# Patient Record
Sex: MALE | Race: WHITE | Employment: FULL TIME | URBAN - METROPOLITAN AREA
[De-identification: names, ages, dates, MRNs, and addresses within clinical notes are randomized per-mention and may not be internally consistent; named-entity substitution may affect disease eponyms.]

---

## 2019-03-09 ENCOUNTER — APPOINTMENT (OUTPATIENT)
Dept: CT IMAGING | Age: 24
End: 2019-03-09
Attending: EMERGENCY MEDICINE
Payer: SELF-PAY

## 2019-03-09 ENCOUNTER — HOSPITAL ENCOUNTER (EMERGENCY)
Age: 24
Discharge: HOME | End: 2019-03-09
Attending: EMERGENCY MEDICINE
Payer: SELF-PAY

## 2019-03-09 VITALS
BODY MASS INDEX: 25.93 KG/M2 | HEIGHT: 74 IN | SYSTOLIC BLOOD PRESSURE: 135 MMHG | HEART RATE: 72 BPM | RESPIRATION RATE: 16 BRPM | TEMPERATURE: 98.3 F | WEIGHT: 202 LBS | OXYGEN SATURATION: 100 % | DIASTOLIC BLOOD PRESSURE: 76 MMHG

## 2019-03-09 DIAGNOSIS — K50.911 CROHN'S DISEASE WITH RECTAL BLEEDING, UNSPECIFIED GASTROINTESTINAL TRACT LOCATION (HCC): Primary | ICD-10-CM

## 2019-03-09 LAB
ALBUMIN SERPL-MCNC: 3.4 G/DL (ref 3.5–5)
ALBUMIN/GLOB SERPL: 0.9 {RATIO}
ALP SERPL-CCNC: 108 U/L (ref 50–136)
ALT SERPL-CCNC: 50 U/L (ref 12–65)
ANION GAP SERPL CALC-SCNC: 6 MMOL/L
AST SERPL-CCNC: 27 U/L (ref 15–37)
BASOPHILS # BLD: 0 K/UL (ref 0–0.2)
BASOPHILS NFR BLD: 0 % (ref 0–2)
BILIRUB SERPL-MCNC: 0.9 MG/DL (ref 0.2–1.1)
BUN SERPL-MCNC: 9 MG/DL (ref 6–23)
CALCIUM SERPL-MCNC: 8.6 MG/DL (ref 8.3–10.4)
CHLORIDE SERPL-SCNC: 107 MMOL/L (ref 98–107)
CO2 SERPL-SCNC: 27 MMOL/L (ref 21–32)
CREAT SERPL-MCNC: 0.74 MG/DL (ref 0.8–1.5)
DIFFERENTIAL METHOD BLD: ABNORMAL
EOSINOPHIL # BLD: 0.2 K/UL (ref 0–0.8)
EOSINOPHIL NFR BLD: 2 % (ref 0.5–7.8)
ERYTHROCYTE [DISTWIDTH] IN BLOOD BY AUTOMATED COUNT: 13.9 % (ref 11.9–14.6)
GLOBULIN SER CALC-MCNC: 3.8 G/DL (ref 2.3–3.5)
GLUCOSE SERPL-MCNC: 90 MG/DL (ref 65–100)
HCT VFR BLD AUTO: 44.2 % (ref 41.1–50.3)
HGB BLD-MCNC: 14.5 G/DL (ref 13.6–17.2)
IMM GRANULOCYTES # BLD AUTO: 0 K/UL (ref 0–0.5)
IMM GRANULOCYTES NFR BLD AUTO: 0 % (ref 0–5)
LYMPHOCYTES # BLD: 2 K/UL (ref 0.5–4.6)
LYMPHOCYTES NFR BLD: 28 % (ref 13–44)
MCH RBC QN AUTO: 29.4 PG (ref 26.1–32.9)
MCHC RBC AUTO-ENTMCNC: 32.8 G/DL (ref 31.4–35)
MCV RBC AUTO: 89.7 FL (ref 79.6–97.8)
MONOCYTES # BLD: 0.7 K/UL (ref 0.1–1.3)
MONOCYTES NFR BLD: 10 % (ref 4–12)
NEUTS SEG # BLD: 4.3 K/UL (ref 1.7–8.2)
NEUTS SEG NFR BLD: 59 % (ref 43–78)
NRBC # BLD: 0 K/UL (ref 0–0.2)
PLATELET # BLD AUTO: 409 K/UL (ref 150–450)
PMV BLD AUTO: 8.8 FL (ref 9.4–12.3)
POTASSIUM SERPL-SCNC: 3.8 MMOL/L (ref 3.5–5.1)
PROT SERPL-MCNC: 7.2 G/DL
RBC # BLD AUTO: 4.93 M/UL (ref 4.23–5.6)
SODIUM SERPL-SCNC: 140 MMOL/L (ref 136–145)
WBC # BLD AUTO: 7.3 K/UL (ref 4.3–11.1)

## 2019-03-09 PROCEDURE — 81003 URINALYSIS AUTO W/O SCOPE: CPT | Performed by: EMERGENCY MEDICINE

## 2019-03-09 PROCEDURE — 74011250636 HC RX REV CODE- 250/636: Performed by: EMERGENCY MEDICINE

## 2019-03-09 PROCEDURE — 74177 CT ABD & PELVIS W/CONTRAST: CPT

## 2019-03-09 PROCEDURE — 80053 COMPREHEN METABOLIC PANEL: CPT

## 2019-03-09 PROCEDURE — 96374 THER/PROPH/DIAG INJ IV PUSH: CPT | Performed by: EMERGENCY MEDICINE

## 2019-03-09 PROCEDURE — 74011636320 HC RX REV CODE- 636/320: Performed by: EMERGENCY MEDICINE

## 2019-03-09 PROCEDURE — 74011000250 HC RX REV CODE- 250: Performed by: EMERGENCY MEDICINE

## 2019-03-09 PROCEDURE — 85025 COMPLETE CBC W/AUTO DIFF WBC: CPT

## 2019-03-09 PROCEDURE — 99284 EMERGENCY DEPT VISIT MOD MDM: CPT | Performed by: EMERGENCY MEDICINE

## 2019-03-09 PROCEDURE — 74011000258 HC RX REV CODE- 258: Performed by: EMERGENCY MEDICINE

## 2019-03-09 PROCEDURE — C9113 INJ PANTOPRAZOLE SODIUM, VIA: HCPCS | Performed by: EMERGENCY MEDICINE

## 2019-03-09 RX ORDER — CIPROFLOXACIN 500 MG/1
500 TABLET ORAL 2 TIMES DAILY
Qty: 28 TAB | Refills: 0 | Status: SHIPPED | OUTPATIENT
Start: 2019-03-09 | End: 2019-03-23

## 2019-03-09 RX ORDER — METRONIDAZOLE 500 MG/1
500 TABLET ORAL 2 TIMES DAILY
Qty: 28 TAB | Refills: 0 | Status: SHIPPED | OUTPATIENT
Start: 2019-03-09 | End: 2019-03-23

## 2019-03-09 RX ORDER — PREDNISONE 20 MG/1
60 TABLET ORAL DAILY
Qty: 12 TAB | Refills: 0 | Status: SHIPPED | OUTPATIENT
Start: 2019-03-09 | End: 2019-03-13

## 2019-03-09 RX ORDER — SODIUM CHLORIDE 0.9 % (FLUSH) 0.9 %
10 SYRINGE (ML) INJECTION
Status: COMPLETED | OUTPATIENT
Start: 2019-03-09 | End: 2019-03-09

## 2019-03-09 RX ADMIN — SODIUM CHLORIDE 40 MG: 9 INJECTION, SOLUTION INTRAMUSCULAR; INTRAVENOUS; SUBCUTANEOUS at 13:28

## 2019-03-09 RX ADMIN — IOPAMIDOL 100 ML: 755 INJECTION, SOLUTION INTRAVENOUS at 14:37

## 2019-03-09 RX ADMIN — DIATRIZOATE MEGLUMINE AND DIATRIZOATE SODIUM 15 ML: 660; 100 LIQUID ORAL; RECTAL at 13:28

## 2019-03-09 RX ADMIN — SODIUM CHLORIDE 100 ML: 900 INJECTION, SOLUTION INTRAVENOUS at 14:37

## 2019-03-09 RX ADMIN — Medication 10 ML: at 14:37

## 2019-03-09 NOTE — ED NOTES
I have reviewed discharge instructions with the patient. The patient verbalized understanding. Patient left ED via Discharge Method: ambulatory to Home with male visitor. Opportunity for questions and clarification provided. Patient given 3 scripts. Prednisone, cipro, flagyl. Instructed to follow up with GI MD provided. Pt declined need for work excuse. To continue your aftercare when you leave the hospital, you may receive an automated call from our care team to check in on how you are doing. This is a free service and part of our promise to provide the best care and service to meet your aftercare needs.  If you have questions, or wish to unsubscribe from this service please call [phone]. Thank you for Choosing our [facility] Emergency Department.

## 2019-03-09 NOTE — DISCHARGE INSTRUCTIONS
Patient Education        Crohn's Disease: Care Instructions  Your Care Instructions    Crohn's disease is a lifelong inflammatory bowel disease (IBD). Parts of the digestive tract get swollen and irritated and may develop deep sores called ulcers. Crohn's disease usually occurs in the last part of the small intestine and the first part of the large intestine. But it can develop anywhere from the mouth to the anus. The main symptoms of Crohn's disease are belly pain, diarrhea, fever, and weight loss. Some people may have constipation. Crohn's disease also sometimes causes problems with the joints, eyes, or skin. Your symptoms may be mild at some times and severe at others. The disease can also go into remission, which means that it is not active and you have no symptoms. Bad attacks of Crohn's disease often have to be treated in the hospital so that you can get medicines and liquids through a tube in your vein, called an IV. This gives your digestive system time to rest and recover. Talk with your doctor about the best treatments for you. You may need medicines that help prevent or treat flare-ups of the disease. You may need surgery to remove part of your bowel if you have an abnormal opening in the bowel (fistula), an abscess, or a bowel obstruction. In some cases, surgery is needed if medicines do not work. But symptoms often return to other areas of the intestines after surgery. Learning good self-care can help you reduce your symptoms and manage Crohn's disease. Follow-up care is a key part of your treatment and safety. Be sure to make and go to all appointments, and call your doctor if you are having problems. It's also a good idea to know your test results and keep a list of the medicines you take. How can you care for yourself at home? · Take your medicines exactly as prescribed. Call your doctor if you think you are having a problem with your medicine.  You will get more details on the specific medicines your doctor prescribes. · Do not take anti-inflammatory medicines, such as aspirin, ibuprofen (Advil, Motrin), or naproxen (Aleve). They may make your symptoms worse. Do not take any other medicines or herbal products without talking to your doctor first.  · Avoid foods that make your symptoms worse. These might include milk, alcohol, high-fiber foods, or spicy foods. · Eat a healthy diet. Make sure to get enough iron. Rectal bleeding may make you lose iron. Good sources of iron include beef, lentils, spinach, raisins, and iron-enriched breads and cereals. · Drink liquid meal replacements if your doctor recommends them. These are high in calories and contain vitamins and minerals. Severe symptoms may make it hard for your body to absorb vitamins and minerals. · Do not smoke. Smoking makes Crohn's disease worse. If you need help quitting, talk to your doctor about stop-smoking programs and medicines. These can increase your chances of quitting for good. · Seek support from friends and family to help cope with Crohn's disease. The illness can affect all parts of your life. Get counseling if you need it. When should you call for help? Call 911 anytime you think you may need emergency care. For example, call if:    · Your stools are maroon or very bloody.     · You passed out (lost consciousness).    Call your doctor now or seek immediate medical care if:    · You are vomiting.     · You have new or worse belly pain.     · You have a fever.     · You cannot pass stools or gas.     · You have new or more blood in your stools.    Watch closely for changes in your health, and be sure to contact your doctor if:    · You have new or worse symptoms.     · You are losing weight.     · You do not get better as expected. Where can you learn more? Go to http://andry-jonnathan.info/. Enter 21 [phone] in the search box to learn more about \"Crohn's Disease: Care Instructions. \"  Current as of: March 27, 2018  Content Version: 11.9  © 2401-9763 [employer]. Care instructions adapted under license by [employer] (which disclaims liability or warranty for this information). If you have questions about a medical condition or this instruction, always ask your healthcare professional. [address] any warranty or liability for your use of this information.

## 2019-12-13 ENCOUNTER — HOSPITAL ENCOUNTER (EMERGENCY)
Dept: HOSPITAL 5 - ED | Age: 24
Discharge: HOME | End: 2019-12-13
Payer: COMMERCIAL

## 2019-12-13 VITALS — DIASTOLIC BLOOD PRESSURE: 80 MMHG | SYSTOLIC BLOOD PRESSURE: 127 MMHG

## 2019-12-13 DIAGNOSIS — F12.10: ICD-10-CM

## 2019-12-13 DIAGNOSIS — K50.90: Primary | ICD-10-CM

## 2019-12-13 LAB
BASOPHILS # (AUTO): 0.1 K/MM3 (ref 0–0.1)
BASOPHILS NFR BLD AUTO: 0.6 % (ref 0–1.8)
BUN SERPL-MCNC: 16 MG/DL (ref 9–20)
BUN/CREAT SERPL: 18 %
CALCIUM SERPL-MCNC: 9.8 MG/DL (ref 8.4–10.2)
EOSINOPHIL # BLD AUTO: 0.1 K/MM3 (ref 0–0.4)
EOSINOPHIL NFR BLD AUTO: 1 % (ref 0–4.3)
HCT VFR BLD CALC: 49.8 % (ref 35.5–45.6)
HEMOLYSIS INDEX: 12
HGB BLD-MCNC: 17.1 GM/DL (ref 11.8–15.2)
LYMPHOCYTES # BLD AUTO: 1.9 K/MM3 (ref 1.2–5.4)
LYMPHOCYTES NFR BLD AUTO: 20.4 % (ref 13.4–35)
MCHC RBC AUTO-ENTMCNC: 34 % (ref 32–34)
MCV RBC AUTO: 90 FL (ref 84–94)
MONOCYTES # (AUTO): 1.1 K/MM3 (ref 0–0.8)
MONOCYTES % (AUTO): 11.9 % (ref 0–7.3)
PLATELET # BLD: 329 K/MM3 (ref 140–440)
RBC # BLD AUTO: 5.51 M/MM3 (ref 3.65–5.03)

## 2019-12-13 PROCEDURE — 99283 EMERGENCY DEPT VISIT LOW MDM: CPT

## 2019-12-13 PROCEDURE — 96374 THER/PROPH/DIAG INJ IV PUSH: CPT

## 2019-12-13 PROCEDURE — 36415 COLL VENOUS BLD VENIPUNCTURE: CPT

## 2019-12-13 PROCEDURE — 80048 BASIC METABOLIC PNL TOTAL CA: CPT

## 2019-12-13 PROCEDURE — 96375 TX/PRO/DX INJ NEW DRUG ADDON: CPT

## 2019-12-13 PROCEDURE — 85025 COMPLETE CBC W/AUTO DIFF WBC: CPT

## 2019-12-13 NOTE — EMERGENCY DEPARTMENT REPORT
ED Abdominal Pain HPI





- General


Chief Complaint: GI Bleed


Stated Complaint: BLOODY STOOL/SOB


Time Seen by Provider: 12/13/19 07:46


Source: patient


Mode of arrival: Ambulatory


Limitations: No Limitations





- History of Present Illness


Initial Comments: 





This is a 24-year-old male with a past medical history of Crohn's disease for 

the past 4 years and presents to ED complaining of Crohn's flareup causing some 

generalized abdominal pain with some dark red blood in stool.  Patient denies 

fevers/nausea vomiting or diarrhea.  Patient states that he has a mild throbbing

headache but denies any injuries.  He also stated that he does not have a GI 

doctor but is waiting to have surgery sometime soon.


MD Complaint: abdominal pain


Location: diffuse


Migration to: no migration


Severity scale (0 -10): 5


Quality: aching





- Related Data


                                  Previous Rx's











 Medication  Instructions  Recorded  Last Taken  Type


 


Dicyclomine [Bentyl] 20 mg PO TID #30 tablet 12/13/19 Unknown Rx


 


predniSONE [Deltasone] 40 mg PO QDAY #15 tab 12/13/19 Unknown Rx











                                    Allergies











Allergy/AdvReac Type Severity Reaction Status Date / Time


 


No Known Allergies Allergy   Unverified 12/13/19 07:34














ED Review of Systems


ROS: 


Stated complaint: BLOODY STOOL/SOB


Other details as noted in HPI





Comment: All other systems reviewed and negative





ED Past Medical Hx





- Past Medical History


Previous Medical History?: Yes


Additional medical history: Crohn's





- Surgical History


Past Surgical History?: No





- Social History


Smoking Status: Never Smoker


Substance Use Type: Alcohol, Marijuana





- Medications


Home Medications: 


                                Home Medications











 Medication  Instructions  Recorded  Confirmed  Last Taken  Type


 


Dicyclomine [Bentyl] 20 mg PO TID #30 tablet 12/13/19  Unknown Rx


 


predniSONE [Deltasone] 40 mg PO QDAY #15 tab 12/13/19  Unknown Rx














ED Physical Exam





- General


Limitations: No Limitations


General appearance: alert, in no apparent distress





- Head


Head exam: Present: atraumatic, normocephalic





- Eye


Eye exam: Present: normal appearance





- ENT


ENT exam: Present: mucous membranes moist





- Neck


Neck exam: Present: normal inspection





- Respiratory


Respiratory exam: Present: normal lung sounds bilaterally.  Absent: respiratory 

distress





- Cardiovascular


Cardiovascular Exam: Present: regular rate, normal rhythm.  Absent: systolic 

murmur, diastolic murmur, rubs, gallop





- GI/Abdominal


GI/Abdominal exam: Present: soft, tenderness (mildly tender at lower abdominal 

quadrant), normal bowel sounds.  Absent: distended, guarding, rebound, mass, 

pulsatile mass





- Rectal


Rectal exam: Present: deferred





- Extremities Exam


Extremities exam: Present: normal inspection





- Back Exam


Back exam: Present: normal inspection





- Neurological Exam


Neurological exam: Present: alert, oriented X3





- Psychiatric


Psychiatric exam: Present: normal affect, normal mood





- Skin


Skin exam: Present: warm, dry, intact, normal color.  Absent: rash





ED Course





                                   Vital Signs











  12/13/19





  07:30


 


Temperature 98.3 F


 


Pulse Rate 116 H


 


Respiratory 20





Rate 


 


Blood Pressure 127/80


 


O2 Sat by Pulse 100





Oximetry 














ED Medical Decision Making





- Lab Data


Result diagrams: 


                                 12/13/19 07:57





                                 12/13/19 07:57








                             Laboratory Last Values











WBC  9.2 K/mm3 (4.5-11.0)   12/13/19  07:57    


 


RBC  5.51 M/mm3 (3.65-5.03)  H  12/13/19  07:57    


 


Hgb  17.1 gm/dl (11.8-15.2)  H  12/13/19  07:57    


 


Hct  49.8 % (35.5-45.6)  H  12/13/19  07:57    


 


MCV  90 fl (84-94)   12/13/19  07:57    


 


MCH  31 pg (28-32)   12/13/19  07:57    


 


MCHC  34 % (32-34)   12/13/19  07:57    


 


RDW  13.7 % (13.2-15.2)   12/13/19  07:57    


 


Plt Count  329 K/mm3 (140-440)   12/13/19  07:57    


 


Lymph % (Auto)  20.4 % (13.4-35.0)   12/13/19  07:57    


 


Mono % (Auto)  11.9 % (0.0-7.3)  H  12/13/19  07:57    


 


Eos % (Auto)  1.0 % (0.0-4.3)   12/13/19  07:57    


 


Baso % (Auto)  0.6 % (0.0-1.8)   12/13/19  07:57    


 


Lymph #  1.9 K/mm3 (1.2-5.4)   12/13/19  07:57    


 


Mono #  1.1 K/mm3 (0.0-0.8)  H  12/13/19  07:57    


 


Eos #  0.1 K/mm3 (0.0-0.4)   12/13/19  07:57    


 


Baso #  0.1 K/mm3 (0.0-0.1)   12/13/19  07:57    


 


Seg Neutrophils %  66.1 % (40.0-70.0)   12/13/19  07:57    


 


Seg Neutrophils #  6.1 K/mm3 (1.8-7.7)   12/13/19  07:57    


 


Sodium  136 mmol/L (137-145)  L  12/13/19  07:57    


 


Potassium  3.5 mmol/L (3.6-5.0)  L  12/13/19  07:57    


 


Chloride  98.7 mmol/L ()   12/13/19  07:57    


 


Carbon Dioxide  20 mmol/L (22-30)  L  12/13/19  07:57    


 


Anion Gap  21 mmol/L  12/13/19  07:57    


 


BUN  16 mg/dL (9-20)   12/13/19  07:57    


 


Creatinine  0.9 mg/dL (0.8-1.5)   12/13/19  07:57    


 


Estimated GFR  > 60 ml/min  12/13/19  07:57    


 


BUN/Creatinine Ratio  18 %  12/13/19  07:57    


 


Glucose  98 mg/dL ()   12/13/19  07:57    


 


Calcium  9.8 mg/dL (8.4-10.2)   12/13/19  07:57    














- Medical Decision Making





This 24-year-old male who presents with Crohn flareup


Patient received IV fluids, glucose steroid Pepcid and Zofran in the ED.


CBC and BMP were within normal limits.  BMP shows mildly low sodium and chloride

 which patient is pain received by fluid resuscitation.


Patient is not in a lot of acute pain noisy..  Patient was able to laugh Fatjo's

 and laugh with his partner in the room. 


Discussed with the patient importance of following up with a GI doctor.  GI 

doctor was referred given to patient.


Vital signs are okay patient is in no acute distress


Critical care attestation.: 


If time is entered above; I have spent that time in minutes in the direct care 

of this critically ill patient, excluding procedure time.








ED Disposition


Clinical Impression: 


 Acute Crohn's disease without complication





Disposition: DC-01 TO HOME OR SELFCARE


Is pt being admited?: No


Does the pt Need Aspirin: No


Condition: Stable


Instructions:  Crohn Disease (ED)


Additional Instructions: 


Make sure to follow up with the primary care physician as discussed.


Having given gastroenterologist referral please follow up


Take all your medications as you've been prescribed.


If you have any worsening symptoms or develop new symptoms please return to ED 

immediately.


Prescriptions: 


Dicyclomine [Bentyl] 20 mg PO TID #30 tablet


predniSONE [Deltasone] 40 mg PO QDAY #15 tab


Referrals: 


[facility] [Provider Group] - 3-5 Days


[facility] [Provider Group] - 3-5 Days


[facility] [Outside] - 3-5 Days


[facility] [Outside] - 3-5 Days


Forms:  Accompanied Note, Work/School Release Form(ED)


Time of Disposition: 08:49

## 2021-07-22 NOTE — ED PROVIDER NOTES
Patient with a questionable history of Crohn's disease. Not on any medications. Also with previous appendectomy. States 1 month history of off and on, blood in the stool. 2 days ago blood, increased. No diarrhea or constipation. No nausea or vomiting. Mild lower abdominal pain. The history is provided by the patient. No  was used. Rectal Bleeding    This is a new problem. The current episode started 2 days ago (one month). The stool is described as bloody coated. Associated symptoms include abdominal pain (mild RLQ). Pertinent negatives include no dysuria, no chills, no fever, no nausea, no back pain, no vomiting, no diarrhea and no constipation. He has tried nothing for the symptoms. Past Medical History:   Diagnosis Date    [facility]        History reviewed. No pertinent surgical history. History reviewed. No pertinent family history. Social History     Socioeconomic History    Marital status: SINGLE     Spouse name: Not on file    Number of children: Not on file    Years of education: Not on file    Highest education level: Not on file   Social Needs    Financial resource strain: Not on file    Food insecurity - worry: Not on file    Food insecurity - inability: Not on file    Transportation needs - medical: Not on file   [employer] needs - non-medical: Not on file   Occupational History    Not on file   Tobacco Use    Smoking status: Current Some Day Smoker     Packs/day: 0.50    Smokeless tobacco: Never Used   Substance and Sexual Activity    Alcohol use: Yes    Drug use: Yes     Types: Cocaine     Comment: reports last time x 4 months     Sexual activity: Not on file   Other Topics Concern    Not on file   Social History Narrative    Not on file         ALLERGIES: Patient has no known allergies. Review of Systems   Constitutional: Negative for chills and fever. HENT: Negative for rhinorrhea and sore throat.     Eyes: Negative for pain and redness. Respiratory: Negative for chest tightness, shortness of breath and wheezing. Cardiovascular: Negative for chest pain and leg swelling. Gastrointestinal: Positive for abdominal pain (mild RLQ) and anal bleeding. Negative for constipation, diarrhea, nausea, rectal pain and vomiting. Genitourinary: Negative for dysuria and hematuria. Musculoskeletal: Negative for back pain, gait problem, neck pain and neck stiffness. Skin: Negative for color change and rash. Neurological: Negative for weakness, numbness and headaches. Vitals:    03/09/19 1255   BP: 133/85   Pulse: 80   Resp: 18   Temp: 98.3 °F (36.8 °C)   SpO2: 99%   Weight: 91.6 kg (202 lb)   Height: 6' 2\" (1.88 m)            Physical Exam   Constitutional: He is oriented to person, place, and time. He appears well-developed and well-nourished. HENT:   Head: Normocephalic and atraumatic. Neck: Normal range of motion. Neck supple. Cardiovascular: Normal rate and regular rhythm. Pulmonary/Chest: Effort normal and breath sounds normal.   Abdominal: Soft. Bowel sounds are normal. There is no tenderness. There is no rebound and no guarding. Genitourinary:   Genitourinary Comments: No hemorrhoids. Mild pain on rectal exam. No mass. Musculoskeletal: Normal range of motion. He exhibits no edema. Neurological: He is alert and oriented to person, place, and time. Skin: Skin is warm and dry. MDM  Number of Diagnoses or Management Options  Diagnosis management comments: Patient with slight wall thickening in the rectum with slight blood present on exam.  We'll treat his Crohn's outpatient and referred to GI.        Amount and/or Complexity of Data Reviewed  Clinical lab tests: ordered and reviewed  Tests in the radiology section of CPT®: ordered and reviewed  Tests in the medicine section of CPT®: ordered and reviewed    Patient Progress  Patient progress: stable         Procedures      Results Include:    Recent Results (from the past 24 hour(s))   CBC WITH AUTOMATED DIFF    Collection Time: 03/09/19  1:32 PM   Result Value Ref Range    WBC 7.3 4.3 - 11.1 K/uL    RBC 4.93 4.23 - 5.6 M/uL    HGB 14.5 13.6 - 17.2 g/dL    HCT 44.2 41.1 - 50.3 %    MCV 89.7 79.6 - 97.8 FL    MCH 29.4 26.1 - 32.9 PG    MCHC 32.8 31.4 - 35.0 g/dL    RDW 13.9 11.9 - 14.6 %    PLATELET 384 374 - 104 K/uL    MPV 8.8 (L) 9.4 - 12.3 FL    ABSOLUTE NRBC 0.00 0.0 - 0.2 K/uL    DF AUTOMATED      NEUTROPHILS 59 43 - 78 %    LYMPHOCYTES 28 13 - 44 %    MONOCYTES 10 4.0 - 12.0 %    EOSINOPHILS 2 0.5 - 7.8 %    BASOPHILS 0 0.0 - 2.0 %    IMMATURE GRANULOCYTES 0 0.0 - 5.0 %    ABS. NEUTROPHILS 4.3 1.7 - 8.2 K/UL    ABS. LYMPHOCYTES 2.0 0.5 - 4.6 K/UL    ABS. MONOCYTES 0.7 0.1 - 1.3 K/UL    ABS. EOSINOPHILS 0.2 0.0 - 0.8 K/UL    ABS. BASOPHILS 0.0 0.0 - 0.2 K/UL    ABS. IMM. GRANS. 0.0 0.0 - 0.5 K/UL   METABOLIC PANEL, COMPREHENSIVE    Collection Time: 03/09/19  1:32 PM   Result Value Ref Range    Sodium 140 136 - 145 mmol/L    Potassium 3.8 3.5 - 5.1 mmol/L    Chloride 107 98 - 107 mmol/L    CO2 27 21 - 32 mmol/L    Anion gap 6 mmol/L    Glucose 90 65 - 100 mg/dL    BUN 9 6 - 23 MG/DL    Creatinine 0.74 (L) 0.8 - 1.5 MG/DL    GFR est AA >60 >60 ml/min/1.73m2    GFR est non-AA >60 ml/min/1.73m2    Calcium 8.6 8.3 - 10.4 MG/DL    Bilirubin, total 0.9 0.2 - 1.1 MG/DL    ALT (SGPT) 50 12 - 65 U/L    AST (SGOT) 27 15 - 37 U/L    Alk. phosphatase 108 50 - 136 U/L    Protein, total 7.2 g/dL    Albumin 3.4 (L) 3.5 - 5.0 g/dL    Globulin 3.8 (H) 2.3 - 3.5 g/dL    A-G Ratio 0.9              CT ABD PELV W CONT (Final result)   Result time 03/09/19 14:51:31   Final result by Sue Copeland MD (03/09/19 14:51:31)                Impression:    IMPRESSION: Wall thickening in the rectum, probably inflammation given the  history of Crohn's disease.             Narrative:    CT of the Abdomen and Pelvis    INDICATION: Rectal bleeding and pain, history of Crohn's disease    Multiple axial images were obtained through the abdomen and pelvis.  Oral  contrast was used for bowel opacification.  100mL of Isovue 370 intravenous  contrast was used for better evaluation of solid organs and vascular structures.   Radiation dose reduction techniques were used for this study.  All CT scans  performed at this facility use one or all of the following: Automated exposure  control, adjustment of the mA and/or kVp according to patient's size, iterative  reconstruction. COMPARISON: None    FINDINGS:  LUNG BASES: No infiltrates or masses. LIVER: Normal in size and appearance.    GALLBLADDER/BILE DUCTS: No gallstones or bile duct dilatation. PANCREAS: Normal.  SPLEEN: Normal.    ADRENALS: Normal.  KIDNEYS/URETERS: No hydronephrosis or significant mass. BLADDER: Normal.  REPRODUCTIVE ORGANS: No pelvic masses. BOWEL: There is wall thickening in the inferior rectum.  Small 8 mm right-sided  perirectal lymph node is present. LYMPH NODES: No significant retroperitoneal, mesenteric, or inguinal adenopathy. BONES: No fracture or significant bone lesion.   OTHER: No ascites.              [Negative] : Heme/Lymph

## 2022-01-01 ENCOUNTER — HOSPITAL ENCOUNTER (EMERGENCY)
Dept: HOSPITAL 5 - ED | Age: 27
Discharge: LEFT BEFORE DISCHARGE | End: 2022-01-01
Payer: SELF-PAY

## 2022-01-01 DIAGNOSIS — R56.9: Primary | ICD-10-CM

## 2022-01-01 DIAGNOSIS — Z53.21: ICD-10-CM
